# Patient Record
Sex: FEMALE | Race: BLACK OR AFRICAN AMERICAN | NOT HISPANIC OR LATINO
[De-identification: names, ages, dates, MRNs, and addresses within clinical notes are randomized per-mention and may not be internally consistent; named-entity substitution may affect disease eponyms.]

---

## 2017-05-22 PROBLEM — Z00.00 ENCOUNTER FOR PREVENTIVE HEALTH EXAMINATION: Status: ACTIVE | Noted: 2017-05-22

## 2017-06-15 ENCOUNTER — FORM ENCOUNTER (OUTPATIENT)
Age: 57
End: 2017-06-15

## 2017-06-16 ENCOUNTER — OUTPATIENT (OUTPATIENT)
Dept: OUTPATIENT SERVICES | Facility: HOSPITAL | Age: 57
LOS: 1 days | End: 2017-06-16
Payer: MEDICAID

## 2017-06-16 ENCOUNTER — APPOINTMENT (OUTPATIENT)
Dept: ORTHOPEDIC SURGERY | Facility: CLINIC | Age: 57
End: 2017-06-16

## 2017-06-16 DIAGNOSIS — Z87.39 PERSONAL HISTORY OF OTHER DISEASES OF THE MUSCULOSKELETAL SYSTEM AND CONNECTIVE TISSUE: ICD-10-CM

## 2017-06-16 DIAGNOSIS — Z78.9 OTHER SPECIFIED HEALTH STATUS: ICD-10-CM

## 2017-06-16 DIAGNOSIS — R20.0 ANESTHESIA OF SKIN: ICD-10-CM

## 2017-06-16 DIAGNOSIS — Z86.79 PERSONAL HISTORY OF OTHER DISEASES OF THE CIRCULATORY SYSTEM: ICD-10-CM

## 2017-06-16 DIAGNOSIS — Z86.59 PERSONAL HISTORY OF OTHER MENTAL AND BEHAVIORAL DISORDERS: ICD-10-CM

## 2017-06-16 PROCEDURE — 72050 X-RAY EXAM NECK SPINE 4/5VWS: CPT

## 2017-06-16 PROCEDURE — 73080 X-RAY EXAM OF ELBOW: CPT

## 2017-06-16 PROCEDURE — 73080 X-RAY EXAM OF ELBOW: CPT | Mod: 26,LT

## 2017-06-16 PROCEDURE — 72050 X-RAY EXAM NECK SPINE 4/5VWS: CPT | Mod: 26

## 2017-06-16 RX ORDER — LABETALOL HYDROCHLORIDE 100 MG/1
100 TABLET, FILM COATED ORAL
Qty: 60 | Refills: 0 | Status: ACTIVE | COMMUNITY
Start: 2017-06-02

## 2017-06-16 RX ORDER — ERGOCALCIFEROL 1.25 MG/1
1.25 MG CAPSULE, LIQUID FILLED ORAL
Qty: 4 | Refills: 0 | Status: ACTIVE | COMMUNITY
Start: 2017-03-02

## 2017-06-16 RX ORDER — AMLODIPINE BESYLATE VALSARTAN HYDROCHLOROTHIAZIDE 10; 25; 320 MG/1; MG/1; MG/1
10-320-25 TABLET, FILM COATED ORAL
Qty: 30 | Refills: 0 | Status: ACTIVE | COMMUNITY
Start: 2017-01-05

## 2017-06-16 RX ORDER — BUPROPION HYDROCHLORIDE 150 MG/1
150 TABLET, EXTENDED RELEASE ORAL
Qty: 30 | Refills: 0 | Status: ACTIVE | COMMUNITY
Start: 2017-01-27

## 2017-06-16 RX ORDER — PREDNISONE 50 MG/1
TABLET ORAL
Refills: 0 | Status: ACTIVE | COMMUNITY

## 2017-06-16 RX ORDER — PAROXETINE HYDROCHLORIDE 40 MG/1
40 TABLET, FILM COATED ORAL
Qty: 30 | Refills: 0 | Status: ACTIVE | COMMUNITY
Start: 2016-11-28

## 2017-06-16 RX ORDER — AZATHIOPRINE 50 1/1
50 TABLET ORAL
Qty: 240 | Refills: 0 | Status: ACTIVE | COMMUNITY
Start: 2017-01-05

## 2017-06-16 RX ORDER — AZATHIOPRINE 100 MG/1
100 TABLET ORAL
Qty: 60 | Refills: 0 | Status: ACTIVE | COMMUNITY
Start: 2016-11-02

## 2017-07-07 ENCOUNTER — APPOINTMENT (OUTPATIENT)
Dept: ORTHOPEDIC SURGERY | Facility: CLINIC | Age: 57
End: 2017-07-07

## 2017-07-07 DIAGNOSIS — M25.522 PAIN IN LEFT ELBOW: ICD-10-CM

## 2017-08-02 ENCOUNTER — APPOINTMENT (OUTPATIENT)
Dept: ORTHOPEDIC SURGERY | Facility: CLINIC | Age: 57
End: 2017-08-02
Payer: MEDICAID

## 2017-08-02 DIAGNOSIS — M77.12 LATERAL EPICONDYLITIS, LEFT ELBOW: ICD-10-CM

## 2017-08-02 DIAGNOSIS — M77.02 MEDIAL EPICONDYLITIS, LEFT ELBOW: ICD-10-CM

## 2017-08-02 PROCEDURE — 99213 OFFICE O/P EST LOW 20 MIN: CPT

## 2017-08-02 RX ORDER — CYCLOBENZAPRINE HYDROCHLORIDE 7.5 MG/1
TABLET, FILM COATED ORAL
Refills: 0 | Status: ACTIVE | COMMUNITY

## 2017-08-02 RX ORDER — DICLOFENAC SODIUM 75 MG/1
TABLET, DELAYED RELEASE ORAL
Refills: 0 | Status: ACTIVE | COMMUNITY

## 2019-06-03 ENCOUNTER — APPOINTMENT (OUTPATIENT)
Dept: PULMONOLOGY | Facility: CLINIC | Age: 59
End: 2019-06-03

## 2019-10-14 ENCOUNTER — APPOINTMENT (OUTPATIENT)
Dept: PULMONOLOGY | Facility: CLINIC | Age: 59
End: 2019-10-14
Payer: MEDICARE

## 2019-10-14 VITALS
WEIGHT: 175 LBS | DIASTOLIC BLOOD PRESSURE: 100 MMHG | OXYGEN SATURATION: 99 % | SYSTOLIC BLOOD PRESSURE: 160 MMHG | HEIGHT: 64 IN | HEART RATE: 92 BPM | BODY MASS INDEX: 29.88 KG/M2 | TEMPERATURE: 98.3 F

## 2019-10-14 DIAGNOSIS — Z82.49 FAMILY HISTORY OF ISCHEMIC HEART DISEASE AND OTHER DISEASES OF THE CIRCULATORY SYSTEM: ICD-10-CM

## 2019-10-14 PROCEDURE — 99204 OFFICE O/P NEW MOD 45 MIN: CPT

## 2019-10-14 RX ORDER — OXYBUTYNIN CHLORIDE 10 MG/1
10 TABLET, EXTENDED RELEASE ORAL
Qty: 90 | Refills: 0 | Status: ACTIVE | COMMUNITY
Start: 2019-10-10

## 2019-10-14 RX ORDER — OMEPRAZOLE 20 MG/1
20 CAPSULE, DELAYED RELEASE ORAL
Qty: 28 | Refills: 0 | Status: ACTIVE | COMMUNITY
Start: 2019-10-08

## 2019-10-14 RX ORDER — CHLORHEXIDINE GLUCONATE 4 %
400 (240 MG) LIQUID (ML) TOPICAL
Qty: 180 | Refills: 0 | Status: ACTIVE | COMMUNITY
Start: 2019-10-08

## 2019-10-14 RX ORDER — FOLIC ACID 1 MG/1
1 TABLET ORAL
Qty: 90 | Refills: 0 | Status: ACTIVE | COMMUNITY
Start: 2019-10-08

## 2019-10-14 RX ORDER — MAGNESIUM OXIDE 400 MG
400 (241.3 MG) TABLET ORAL
Qty: 60 | Refills: 0 | Status: ACTIVE | COMMUNITY
Start: 2019-04-09

## 2019-10-14 RX ORDER — LOSARTAN POTASSIUM 100 MG/1
100 TABLET, FILM COATED ORAL
Qty: 90 | Refills: 0 | Status: ACTIVE | COMMUNITY
Start: 2019-10-08

## 2019-10-14 RX ORDER — BUPROPION HYDROCHLORIDE 150 MG/1
150 TABLET, FILM COATED, EXTENDED RELEASE ORAL
Qty: 30 | Refills: 0 | Status: ACTIVE | COMMUNITY
Start: 2019-10-09

## 2019-10-14 RX ORDER — BISMUTH SUBSALICYLATE 262 MG
262 TABLET,CHEWABLE ORAL
Qty: 112 | Refills: 0 | Status: ACTIVE | COMMUNITY
Start: 2019-05-29

## 2019-10-14 RX ORDER — METRONIDAZOLE 500 MG/1
500 TABLET ORAL
Qty: 56 | Refills: 0 | Status: ACTIVE | COMMUNITY
Start: 2019-05-29

## 2019-10-14 RX ORDER — TETRACYCLINE HYDROCHLORIDE 500 MG/1
500 CAPSULE ORAL
Qty: 56 | Refills: 0 | Status: ACTIVE | COMMUNITY
Start: 2019-05-29

## 2019-10-14 NOTE — ASSESSMENT
[FreeTextEntry1] : obstructive sleep apnea, moderate to severe on prior study, never tolerated CPAP\par \par Treatment options for sleep disordered breathing were discussed.  The most rapid and successful treatment remains nasal CPAP or BilevelPAP.  Alternatives include upper airway surgery such as uvulopharyngoplasty or a dental appliance (better for milder cases).  Recently hypoglossal nerve stimulation has been used.  Positional therapy (avoidance of supine posture) can be helpful, and all patients should try to maintain a healthy weight and avoid alcohol or other sedating medications close to bedtime \par \par The patient is advised that in addition to worsening sleep leading to daytime sleepiness, sleep apnea may be associated with worsening hypertension and may be a risk factor for cardiovascular disease and stroke. \par \par Think she might be able to tolerate CPAP with different interface, some desensitization rx. Because of a high likelihood of obstructive sleep apnea, split-night polysomnography will be ordered. If the first few hours of the diagnostic recording confirm the clinical suspicion of severe sleep apnea, CPAP treatment  will be started on the remainder of the night and titrated to an optimal pressure. If successful, CPAP will be ordered after the study.  If severe sleep apnea is not seen, the study will continue as a diagnostic study.

## 2019-10-14 NOTE — HISTORY OF PRESENT ILLNESS
[FreeTextEntry1] : 10/14/2019 :  GONZALEZ CHRISTIANSON is a 59 year female who is here for treatment of obstructive sleep apnea. She tells me that she was diagnosed at the [facility] in 2016 with obstructive sleep apnea. Reviewing the records from that time shows that she had a study January 16, 2016 showing moderate to severe sleep disordered breathing with an apnea hypopnea index of 38. She appears to have had about 25 oxygen desaturations per hour, sleep quality was fair with no stage III sleep. After that she was given CPAP and never became very comfortable with this. There's been no significant change in weight. She reports very severe daytime sleepiness with an [place] sleepiness score of 17.  She is told of severe snoring and witnessed apnea. She often wakes up with a headache as well as nasal and sinus congestion and a dry mouth.\par \par Her symptoms have worsened since that time and she reports worsening daytime sleepiness. There is no history of parasomnia, sleep paralysis, cataplexy, or other sleep disturbance. Usual bedtime is between 10 and 11, she falls asleep quickly, gets up at 5 AM. She wakes up several times  on a typical night.

## 2019-10-14 NOTE — PHYSICAL EXAM
[General Appearance - Well Developed] : well developed [Normal Appearance] : normal appearance [General Appearance - Well Nourished] : well nourished [Well Groomed] : well groomed [No Deformities] : no deformities [General Appearance - In No Acute Distress] : no acute distress [Normal Conjunctiva] : the conjunctiva exhibited no abnormalities [Eyelids - No Xanthelasma] : the eyelids demonstrated no xanthelasmas [Neck Cervical Mass (___cm)] : no neck mass was observed [Neck Appearance] : the appearance of the neck was normal [III] : III [Jugular Venous Distention Increased] : there was no jugular-venous distention [Thyroid Nodule] : there were no palpable thyroid nodules [Thyroid Diffuse Enlargement] : the thyroid was not enlarged [Murmurs] : no murmurs [Heart Rate And Rhythm] : heart rate was normal and rhythm regular [Heart Sounds Gallop] : no gallops [Heart Sounds] : normal S1 and S2 [Heart Sounds Pericardial Friction Rub] : no pericardial rub [Auscultation Breath Sounds / Voice Sounds] : lungs were clear to auscultation bilaterally [Abnormal Walk] : normal gait [Musculoskeletal - Swelling] : no joint swelling seen [Motor Tone] : muscle strength and tone were normal [Petechial Hemorrhages (___cm)] : no petechial hemorrhages [Nail Clubbing] : no clubbing of the fingernails [Cyanosis, Localized] : no localized cyanosis [Skin Color & Pigmentation] : normal skin color and pigmentation [Skin Turgor] : normal skin turgor [] : no rash

## 2019-10-18 ENCOUNTER — OUTPATIENT (OUTPATIENT)
Dept: OUTPATIENT SERVICES | Facility: HOSPITAL | Age: 59
LOS: 1 days | End: 2019-10-18
Payer: MEDICARE

## 2019-10-18 ENCOUNTER — APPOINTMENT (OUTPATIENT)
Dept: SLEEP CENTER | Facility: HOSPITAL | Age: 59
End: 2019-10-18

## 2019-10-18 DIAGNOSIS — G47.33 OBSTRUCTIVE SLEEP APNEA (ADULT) (PEDIATRIC): ICD-10-CM

## 2019-10-18 PROCEDURE — 95811 POLYSOM 6/>YRS CPAP 4/> PARM: CPT | Mod: 26

## 2019-10-18 PROCEDURE — 95811 POLYSOM 6/>YRS CPAP 4/> PARM: CPT

## 2019-10-21 ENCOUNTER — OTHER (OUTPATIENT)
Age: 59
End: 2019-10-21

## 2019-12-04 ENCOUNTER — APPOINTMENT (OUTPATIENT)
Dept: PULMONOLOGY | Facility: CLINIC | Age: 59
End: 2019-12-04
Payer: MEDICARE

## 2019-12-04 VITALS
DIASTOLIC BLOOD PRESSURE: 100 MMHG | HEART RATE: 93 BPM | TEMPERATURE: 98.4 F | RESPIRATION RATE: 12 BRPM | OXYGEN SATURATION: 97 % | SYSTOLIC BLOOD PRESSURE: 130 MMHG | WEIGHT: 168 LBS | HEIGHT: 64 IN | BODY MASS INDEX: 28.68 KG/M2

## 2019-12-04 PROCEDURE — 99214 OFFICE O/P EST MOD 30 MIN: CPT

## 2019-12-04 NOTE — HISTORY OF PRESENT ILLNESS
[FreeTextEntry1] : 10/14/2019 :  OGNZALEZ CHRISTIANSON is a 59 year female who is here for treatment of obstructive sleep apnea. She tells me that she was diagnosed at the [facility] in 2016 with obstructive sleep apnea. Reviewing the records from that time shows that she had a study January 16, 2016 showing moderate to severe sleep disordered breathing with an apnea hypopnea index of 38. She appears to have had about 25 oxygen desaturations per hour, sleep quality was fair with no stage III sleep. After that she was given CPAP and never became very comfortable with this. There's been no significant change in weight. She reports very severe daytime sleepiness with an [place] sleepiness score of 17.  She is told of severe snoring and witnessed apnea. She often wakes up with a headache as well as nasal and sinus congestion and a dry mouth.\par \par Her symptoms have worsened since that time and she reports worsening daytime sleepiness. There is no history of parasomnia, sleep paralysis, cataplexy, or other sleep disturbance. Usual bedtime is between 10 and 11, she falls asleep quickly, gets up at 5 AM. She wakes up several times  on a typical night.\par \par 12/4/19- had repeat overnight polysomnography, split night, 10/18/19 showing Apnea Hypopnea Index 38, did well on CPAP, auto PAP  ordered.  She has been contacted by durable medical equipment provider ([employer]) but not set up yet.  Willing to start rx, significant excessive daytime somnolence

## 2019-12-04 NOTE — ASSESSMENT
[FreeTextEntry1] : obstructive sleep apnea, severe\par \par The patient is advised that in addition to worsening sleep leading to daytime sleepiness, sleep apnea may be associated with worsening hypertension and may be a risk factor for cardiovascular disease and stroke.\par \par Can consider other options, but urged her to start CPAP now-given info to contact durable medical equipment provider.  Given long term advice about CPAP use.   \par \par F/U 6 weeks

## 2019-12-04 NOTE — PHYSICAL EXAM
[Normal Appearance] : normal appearance [General Appearance - Well Developed] : well developed [No Deformities] : no deformities [General Appearance - Well Nourished] : well nourished [Well Groomed] : well groomed [III] : III [General Appearance - In No Acute Distress] : no acute distress

## 2020-03-04 ENCOUNTER — APPOINTMENT (OUTPATIENT)
Dept: PULMONOLOGY | Facility: CLINIC | Age: 60
End: 2020-03-04
Payer: MEDICARE

## 2020-03-04 VITALS
HEIGHT: 64 IN | BODY MASS INDEX: 28.85 KG/M2 | DIASTOLIC BLOOD PRESSURE: 100 MMHG | SYSTOLIC BLOOD PRESSURE: 140 MMHG | WEIGHT: 169 LBS | OXYGEN SATURATION: 98 % | HEART RATE: 81 BPM | TEMPERATURE: 98.4 F | RESPIRATION RATE: 12 BRPM

## 2020-03-04 DIAGNOSIS — I10 ESSENTIAL (PRIMARY) HYPERTENSION: ICD-10-CM

## 2020-03-04 DIAGNOSIS — G47.33 OBSTRUCTIVE SLEEP APNEA (ADULT) (PEDIATRIC): ICD-10-CM

## 2020-03-04 PROCEDURE — 99214 OFFICE O/P EST MOD 30 MIN: CPT

## 2020-03-04 NOTE — ASSESSMENT
[FreeTextEntry1] : downloaded CPAP today, only tried 2 x for < 1 h since rec'd.  Tells me does want to use, have given her info to call her durable medical equipment provider and get new interface.  Warned that if she does not use CPAP at least 4h 70% of nights in next month it will not be paid for.  Needs to return in 1 month. \par \par BP is out of control, told her it is likely because of med non compliance

## 2020-03-04 NOTE — HISTORY OF PRESENT ILLNESS
[FreeTextEntry1] : 10/14/2019 :  GONZALEZ CHRISTIANSON is a 59 year female who is here for treatment of obstructive sleep apnea. She tells me that she was diagnosed at the [facility] in 2016 with obstructive sleep apnea. Reviewing the records from that time shows that she had a study January 16, 2016 showing moderate to severe sleep disordered breathing with an apnea hypopnea index of 38. She appears to have had about 25 oxygen desaturations per hour, sleep quality was fair with no stage III sleep. After that she was given CPAP and never became very comfortable with this. There's been no significant change in weight. She reports very severe daytime sleepiness with an [place] sleepiness score of 17.  She is told of severe snoring and witnessed apnea. She often wakes up with a headache as well as nasal and sinus congestion and a dry mouth.\par \par Her symptoms have worsened since that time and she reports worsening daytime sleepiness. There is no history of parasomnia, sleep paralysis, cataplexy, or other sleep disturbance. Usual bedtime is between 10 and 11, she falls asleep quickly, gets up at 5 AM. She wakes up several times  on a typical night.\par \par 12/4/19- had repeat overnight polysomnography, split night, 10/18/19 showing Apnea Hypopnea Index 38, did well on CPAP, auto PAP  ordered.  She has been contacted by durable medical equipment provider ([employer]) but not set up yet.  Willing to start rx, significant excessive daytime somnolence \par \par 3/4/2020:  has had CPAP at home since 1/7/20, [employer] is durable medical equipment provider, no real use since rec'd per download today.  Has FFM, not comfortable, wants to try under nose mask. Sx unchanged.  Also note BP out of control again with diastolic 100, no meds taken today

## 2020-03-04 NOTE — PHYSICAL EXAM
[General Appearance - Well Developed] : well developed [Normal Appearance] : normal appearance [Well Groomed] : well groomed [General Appearance - Well Nourished] : well nourished [No Deformities] : no deformities [III] : III [General Appearance - In No Acute Distress] : no acute distress [Heart Sounds] : normal S1 and S2 [Heart Rate And Rhythm] : heart rate was normal and rhythm regular [Heart Sounds Gallop] : no gallops [Murmurs] : no murmurs [Heart Sounds Pericardial Friction Rub] : no pericardial rub [] : no respiratory distress [Auscultation Breath Sounds / Voice Sounds] : lungs were clear to auscultation bilaterally

## 2020-04-01 ENCOUNTER — APPOINTMENT (OUTPATIENT)
Dept: PULMONOLOGY | Facility: CLINIC | Age: 60
End: 2020-04-01

## 2020-07-15 ENCOUNTER — APPOINTMENT (OUTPATIENT)
Dept: PULMONOLOGY | Facility: CLINIC | Age: 60
End: 2020-07-15